# Patient Record
Sex: FEMALE | Race: WHITE | NOT HISPANIC OR LATINO | Employment: UNEMPLOYED | URBAN - METROPOLITAN AREA
[De-identification: names, ages, dates, MRNs, and addresses within clinical notes are randomized per-mention and may not be internally consistent; named-entity substitution may affect disease eponyms.]

---

## 2023-04-11 ENCOUNTER — OFFICE VISIT (OUTPATIENT)
Dept: PRIMARY CARE | Facility: CLINIC | Age: 23
End: 2023-04-11
Payer: COMMERCIAL

## 2023-04-11 VITALS
WEIGHT: 117 LBS | BODY MASS INDEX: 20.08 KG/M2 | OXYGEN SATURATION: 99 % | DIASTOLIC BLOOD PRESSURE: 82 MMHG | TEMPERATURE: 97.4 F | HEART RATE: 86 BPM | SYSTOLIC BLOOD PRESSURE: 124 MMHG

## 2023-04-11 DIAGNOSIS — L72.9 INFECTED CYST OF SKIN: Primary | ICD-10-CM

## 2023-04-11 DIAGNOSIS — L08.9 INFECTED CYST OF SKIN: Primary | ICD-10-CM

## 2023-04-11 PROCEDURE — 99213 OFFICE O/P EST LOW 20 MIN: CPT | Performed by: FAMILY MEDICINE

## 2023-04-11 PROCEDURE — 1036F TOBACCO NON-USER: CPT | Performed by: FAMILY MEDICINE

## 2023-04-11 RX ORDER — DOXYCYCLINE 100 MG/1
100 CAPSULE ORAL 2 TIMES DAILY
Qty: 10 CAPSULE | Refills: 0 | Status: SHIPPED | OUTPATIENT
Start: 2023-04-11 | End: 2023-04-16

## 2023-04-11 RX ORDER — MESALAMINE 500 MG/1
500 CAPSULE, EXTENDED RELEASE ORAL 2 TIMES DAILY
COMMUNITY

## 2023-04-11 RX ORDER — METRONIDAZOLE 7.5 MG/G
CREAM TOPICAL
COMMUNITY
Start: 2021-01-07 | End: 2023-12-18

## 2023-04-11 RX ORDER — FLUOXETINE 20 MG/1
TABLET ORAL
COMMUNITY

## 2023-04-11 RX ORDER — DESONIDE 0.5 MG/G
CREAM TOPICAL
COMMUNITY
Start: 2019-06-18 | End: 2023-12-18

## 2023-04-11 RX ORDER — LANSOPRAZOLE 30 MG/1
30 CAPSULE, DELAYED RELEASE ORAL DAILY
COMMUNITY

## 2023-04-11 RX ORDER — ONDANSETRON 4 MG/1
TABLET, ORALLY DISINTEGRATING ORAL
COMMUNITY

## 2023-04-11 RX ORDER — ADALIMUMAB 40MG/0.4ML
KIT SUBCUTANEOUS
COMMUNITY

## 2023-04-11 RX ORDER — BUSPIRONE HYDROCHLORIDE 5 MG/1
1 TABLET ORAL 2 TIMES DAILY
COMMUNITY

## 2023-04-11 RX ORDER — TRETINOIN 0.25 MG/G
CREAM TOPICAL
COMMUNITY
Start: 2022-11-02

## 2023-04-11 RX ORDER — RIMEGEPANT SULFATE 75 MG/75MG
1 TABLET, ORALLY DISINTEGRATING ORAL EVERY OTHER DAY
COMMUNITY
Start: 2022-03-10

## 2023-04-11 RX ORDER — RIZATRIPTAN BENZOATE 5 MG/1
TABLET ORAL
COMMUNITY
Start: 2023-03-30

## 2023-04-11 RX ORDER — CLOBETASOL PROPIONATE 0.46 MG/ML
SOLUTION TOPICAL
COMMUNITY
End: 2023-12-05

## 2023-04-11 RX ORDER — CLONAZEPAM 0.5 MG/1
TABLET ORAL
COMMUNITY
Start: 2018-06-25

## 2023-04-11 RX ORDER — FOLIC ACID 1 MG/1
1 TABLET ORAL DAILY
COMMUNITY
Start: 2019-08-07

## 2023-04-11 RX ORDER — CALCIUM CARBONATE/VITAMIN D3 500 MG-10
TABLET,CHEWABLE ORAL 2 TIMES DAILY
COMMUNITY

## 2023-04-11 RX ORDER — KETOCONAZOLE 20 MG/ML
SHAMPOO, SUSPENSION TOPICAL
COMMUNITY
End: 2023-12-18

## 2023-04-11 RX ORDER — FLUTICASONE PROPIONATE 50 MCG
SPRAY, SUSPENSION (ML) NASAL
COMMUNITY
End: 2023-12-20 | Stop reason: SDUPTHER

## 2023-04-11 ASSESSMENT — PATIENT HEALTH QUESTIONNAIRE - PHQ9
2. FEELING DOWN, DEPRESSED OR HOPELESS: NOT AT ALL
1. LITTLE INTEREST OR PLEASURE IN DOING THINGS: NOT AT ALL
SUM OF ALL RESPONSES TO PHQ9 QUESTIONS 1 AND 2: 0

## 2023-04-11 NOTE — PROGRESS NOTES
"  Assessment     ASSESSMENT/PLAN:      Problem List Items Addressed This Visit    None  Visit Diagnoses       Sebaceous cyst    -  Primary    Relevant Orders    Referral to General Surgery            Patient Instructions:  Patient Instructions   I believe the mass is likely a sebaceous cyst. Will send you to general surgery for further evaluation and possible removal.       Signed by: Susanna Ryan DO       FUTURE DIRECTION:   none    Subjective   SUBJECTIVE:     HPI : Patient is a 23 y.o. female who presents today for the following:     Left axillary mass: has been ongoing for the past 2 months, last week was swollen, painful and looks \"bruised\"  Deneis prior. States that she has been waking up with night sweats which attributes to her medication but deneis weightloss, chills, or fever, or changes in appetite.         Review of Systems   Skin:         Mass under left axillary        Past Medical History:   Diagnosis Date    Impacted cerumen, bilateral 07/27/2018    Excessive ear wax, bilateral    Other specified disorders of muscle 07/13/2018    Pelvic floor dysfunction    Personal history of other diseases of the digestive system     History of Crohn's disease    Personal history of other diseases of the respiratory system 04/14/2018    History of nasal obstruction    Personal history of other diseases of the respiratory system 05/16/2018    History of nasal discharge    Personal history of other drug therapy     COVID-19 vaccine series completed    Personal history of other mental and behavioral disorders 12/30/2018    History of anorexia nervosa    Psychological and behavioral factors associated with disorders or diseases classified elsewhere 12/07/2013    Psychological factor affecting physical condition        Past Surgical History:   Procedure Laterality Date    OTHER SURGICAL HISTORY  08/07/2019    Rhinologic surgery        Current Outpatient Medications   Medication Instructions    busPIRone " (Buspar) 5 mg tablet 1 tablet, oral, 2 times daily    calcium carbonate-vitamin D3 500 mg-10 mcg (400 unit) chewable tablet oral, 2 times daily    clobetasol (Temovate) 0.05 % external solution PLEASE SEE ATTACHED FOR DETAILED DIRECTIONS    clonazePAM (KlonoPIN) 0.5 mg tablet     desonide (DesOwen) 0.05 % cream PLEASE SEE ATTACHED FOR DETAILED DIRECTIONS    FLUoxetine (PROzac) 20 mg tablet TAKE 1.5 (30MG) TABLET BY MOUTH DAILY.    fluticasone (Flonase) 50 mcg/actuation nasal spray Inhale 1 sprays into each nostril once daily    folic acid (Folvite) 1 mg tablet 1 tablet, oral, Daily    Humira,CF, 40 mg/0.4 mL syringe kit prefilled syringe     ketoconazole (NIZOral) 2 % shampoo     Lactobacillus rhamnosus GG (CULTURELLE) 15 billion cell capsule, sprinkle capsule oral    lansoprazole (PREVACID) 30 mg, oral, Daily    mesalamine (PENTASA) 500 mg, oral, 2 times daily, Do not crush, chew, or split.     metroNIDAZOLE (Metrocream) 0.75 % cream     Nurtec ODT 75 mg tablet,disintegrating 1 tablet, oral, Every other day    ondansetron ODT (Zofran-ODT) 4 mg disintegrating tablet TAKE 1 TABLET BY MOUTH EVERY 8 HOURS AS NEEDED FOR NAUSEA/VOMITING. DISSOLVE ON TONGUE.    rizatriptan (Maxalt) 5 mg tablet TAKE 1 TABLET BY MOUTH AS NEEDED. MAY REPEAT IN 2 HOURS IF NEEDED    tretinoin (Retin-A) 0.025 % cream         Allergies   Allergen Reactions    Infliximab Anaphylaxis and Other     chest pain    Bee Pollen Other    Ibuprofen Other        Social History     Socioeconomic History    Marital status: Single     Spouse name: Not on file    Number of children: Not on file    Years of education: Not on file    Highest education level: Not on file   Occupational History    Not on file   Tobacco Use    Smoking status: Never    Smokeless tobacco: Never   Vaping Use    Vaping status: Not on file   Substance and Sexual Activity    Alcohol use: Not on file    Drug use: Not on file    Sexual activity: Not on file   Other Topics Concern    Not on  file   Social History Narrative    Not on file     Social Determinants of Health     Financial Resource Strain: Not on file   Food Insecurity: Not on file   Transportation Needs: Not on file   Physical Activity: Not on file   Stress: Not on file   Social Connections: Not on file   Intimate Partner Violence: Not on file   Housing Stability: Not on file        No family history on file.     Objective     OBJECTIVE:     Vitals:    04/11/23 1443   BP: 124/82   Pulse: 86   Temp: 36.3 °C (97.4 °F)   SpO2: 99%   Weight: 53.1 kg (117 lb)        Physical Exam  Constitutional:       Appearance: Normal appearance.   HENT:      Head: Normocephalic.   Pulmonary:      Effort: Pulmonary effort is normal.   Musculoskeletal:      Cervical back: Normal range of motion.   Skin:     Comments: ~1cm axillary mass that soft and mobile but does have ecchymosis    Neurological:      Mental Status: She is alert.   Psychiatric:         Mood and Affect: Mood normal.

## 2023-04-11 NOTE — PATIENT INSTRUCTIONS
I believe the mass is likely a sebaceous cyst. Will send you to general surgery for further evaluation and possible removal. Start doxycycline to help with infection

## 2023-04-14 ENCOUNTER — APPOINTMENT (OUTPATIENT)
Dept: PRIMARY CARE | Facility: CLINIC | Age: 23
End: 2023-04-14
Payer: COMMERCIAL

## 2023-04-15 LAB
GRAM STAIN: ABNORMAL
TISSUE/WOUND CULTURE/SMEAR: ABNORMAL

## 2023-04-18 ENCOUNTER — APPOINTMENT (OUTPATIENT)
Dept: PRIMARY CARE | Facility: CLINIC | Age: 23
End: 2023-04-18
Payer: COMMERCIAL

## 2023-07-28 ENCOUNTER — OFFICE VISIT (OUTPATIENT)
Dept: PRIMARY CARE | Facility: CLINIC | Age: 23
End: 2023-07-28
Payer: COMMERCIAL

## 2023-07-28 VITALS
TEMPERATURE: 98 F | DIASTOLIC BLOOD PRESSURE: 68 MMHG | HEART RATE: 87 BPM | OXYGEN SATURATION: 97 % | SYSTOLIC BLOOD PRESSURE: 110 MMHG

## 2023-07-28 DIAGNOSIS — J01.90 ACUTE SINUSITIS, RECURRENCE NOT SPECIFIED, UNSPECIFIED LOCATION: Primary | ICD-10-CM

## 2023-07-28 PROBLEM — K50.90 CROHN'S DISEASE (MULTI): Status: ACTIVE | Noted: 2022-07-11

## 2023-07-28 PROBLEM — F32.A DEPRESSIVE DISORDER: Status: ACTIVE | Noted: 2020-07-30

## 2023-07-28 PROBLEM — J30.89 ALLERGIC RHINITIS DUE TO DUST MITE: Status: ACTIVE | Noted: 2023-07-28

## 2023-07-28 PROBLEM — N80.9 ENDOMETRIOSIS: Status: ACTIVE | Noted: 2019-05-31

## 2023-07-28 PROBLEM — F41.1 GENERALIZED ANXIETY DISORDER: Status: ACTIVE | Noted: 2022-09-29

## 2023-07-28 PROCEDURE — 1036F TOBACCO NON-USER: CPT | Performed by: FAMILY MEDICINE

## 2023-07-28 PROCEDURE — 99213 OFFICE O/P EST LOW 20 MIN: CPT | Performed by: FAMILY MEDICINE

## 2023-07-28 RX ORDER — LORATADINE 10 MG
10 TABLET,DISINTEGRATING ORAL DAILY
COMMUNITY

## 2023-07-28 RX ORDER — DEXTROMETHORPHAN HB/DOXYLAMINE 15-6.25/15
1 SOLUTION, ORAL ORAL DAILY
Qty: 15 TABLET | Refills: 0 | Status: SHIPPED | OUTPATIENT
Start: 2023-07-28 | End: 2023-08-12

## 2023-07-28 ASSESSMENT — ENCOUNTER SYMPTOMS
SINUS PRESSURE: 1
SHORTNESS OF BREATH: 0
SINUS PAIN: 1

## 2023-07-28 NOTE — PROGRESS NOTES
Assessment/Plan   ASSESSMENT/PLAN:      Melida was seen today for nasal congestion.  Diagnoses and all orders for this visit:  Acute sinusitis, recurrence not specified, unspecified location (Primary)  -     loratadine-pseudoephedrine (Loratadine-D)  mg 24 hr tablet; Take 1 tablet by mouth once daily for 15 days. Do not crush, chew, or split.       Patient Instructions   Acute sinusitis: Recommend Claritin-D, continue nasal lavage, continue Flonase, follow-up if no improvement    Susanna Ryan,      FUTURE DIRECTION:     Subjective   SUBJECTIVE:     Patient ID: Melida Barth is a 23 y.o. femalefor the following:    Sinus pressure  Pt states that she was diagnosed with Covid with sx of cough, myalgia, fever, diarrhea 2 weeks ago   Has been experiencing sinus pressure, clear nasal discharge   Denies fever or chills at this time   Has tried sudafed, flonase and netti pot which has been helpful     Review of Systems   HENT:  Positive for sinus pressure and sinus pain.    Respiratory:  Negative for shortness of breath.    Cardiovascular:  Negative for chest pain.       Allergies   Allergen Reactions    Infliximab Anaphylaxis and Other     chest pain    Ibuprofen Other         Current Outpatient Medications:     busPIRone (Buspar) 5 mg tablet, Take 1 tablet (5 mg) by mouth 2 times a day., Disp: , Rfl:     calcium carbonate-vitamin D3 500 mg-10 mcg (400 unit) chewable tablet, Chew twice a day., Disp: , Rfl:     clobetasol (Temovate) 0.05 % external solution, PLEASE SEE ATTACHED FOR DETAILED DIRECTIONS, Disp: , Rfl:     desonide (DesOwen) 0.05 % cream, PLEASE SEE ATTACHED FOR DETAILED DIRECTIONS, Disp: , Rfl:     FLUoxetine (PROzac) 20 mg tablet, TAKE 1.5 (30MG) TABLET BY MOUTH DAILY., Disp: , Rfl:     fluticasone (Flonase) 50 mcg/actuation nasal spray, Inhale 1 sprays into each nostril once daily, Disp: , Rfl:     folic acid (Folvite) 1 mg tablet, Take 1 tablet (1 mg) by mouth once daily., Disp: , Rfl:      Humira,CF, 40 mg/0.4 mL syringe kit prefilled syringe, , Disp: , Rfl:     ketoconazole (NIZOral) 2 % shampoo, , Disp: , Rfl:     Lactobacillus rhamnosus GG (CULTURELLE) 15 billion cell capsule, sprinkle capsule, Take by mouth., Disp: , Rfl:     lansoprazole (Prevacid) 30 mg DR capsule, Take 1 capsule (30 mg) by mouth once daily., Disp: , Rfl:     mesalamine (Pentasa) 500 mg ER capsule, Take 1 capsule (500 mg) by mouth 2 times a day. Do not crush, chew, or split., Disp: , Rfl:     metroNIDAZOLE (Metrocream) 0.75 % cream, , Disp: , Rfl:     Nurtec ODT 75 mg tablet,disintegrating, Take 1 tablet (75 mg) by mouth every other day., Disp: , Rfl:     ondansetron ODT (Zofran-ODT) 4 mg disintegrating tablet, TAKE 1 TABLET BY MOUTH EVERY 8 HOURS AS NEEDED FOR NAUSEA/VOMITING. DISSOLVE ON TONGUE., Disp: , Rfl:     rizatriptan (Maxalt) 5 mg tablet, TAKE 1 TABLET BY MOUTH AS NEEDED. MAY REPEAT IN 2 HOURS IF NEEDED, Disp: , Rfl:     tretinoin (Retin-A) 0.025 % cream, , Disp: , Rfl:     clonazePAM (KlonoPIN) 0.5 mg tablet, , Disp: , Rfl:     loratadine (Claritin Reditabs) 10 mg disintegrating tablet, Take 1 tablet (10 mg) by mouth once daily., Disp: , Rfl:     loratadine-pseudoephedrine (Loratadine-D)  mg 24 hr tablet, Take 1 tablet by mouth once daily for 15 days. Do not crush, chew, or split., Disp: 15 tablet, Rfl: 0     Patient Active Problem List   Diagnosis    Allergic rhinitis due to dust mite    Crohn's disease (CMS/HCC)    Depressive disorder    Endometriosis    Generalized anxiety disorder       Social History     Socioeconomic History    Marital status: Single     Spouse name: Not on file    Number of children: Not on file    Years of education: Not on file    Highest education level: Not on file   Occupational History    Not on file   Tobacco Use    Smoking status: Never    Smokeless tobacco: Never   Substance and Sexual Activity    Alcohol use: Not on file    Drug use: Not on file    Sexual activity: Not on file    Other Topics Concern    Not on file   Social History Narrative    Not on file     Social Determinants of Health     Financial Resource Strain: Not on file   Food Insecurity: Not on file   Transportation Needs: Not on file   Physical Activity: Not on file   Stress: Not on file   Social Connections: Not on file   Intimate Partner Violence: Not on file   Housing Stability: Not on file       Objective   OBJECTIVE:     Vitals:    07/28/23 1516   BP: 110/68   Pulse: 87   Temp: 36.7 °C (98 °F)   SpO2: 97%       Exam      Physical Exam  Constitutional:       Appearance: Normal appearance.   HENT:      Head: Normocephalic.      Right Ear: A middle ear effusion is present.      Left Ear: A middle ear effusion is present.   Pulmonary:      Effort: Pulmonary effort is normal.   Musculoskeletal:      Cervical back: Normal range of motion.   Neurological:      Mental Status: She is alert.   Psychiatric:         Mood and Affect: Mood normal.

## 2023-07-28 NOTE — PATIENT INSTRUCTIONS
Acute sinusitis: Recommend Claritin-D, continue nasal lavage, continue Flonase, follow-up if no improvement

## 2023-08-02 ENCOUNTER — OFFICE VISIT (OUTPATIENT)
Dept: PRIMARY CARE | Facility: CLINIC | Age: 23
End: 2023-08-02
Payer: COMMERCIAL

## 2023-08-02 VITALS
SYSTOLIC BLOOD PRESSURE: 102 MMHG | DIASTOLIC BLOOD PRESSURE: 70 MMHG | BODY MASS INDEX: 19.57 KG/M2 | TEMPERATURE: 97.3 F | OXYGEN SATURATION: 97 % | WEIGHT: 114 LBS | HEART RATE: 62 BPM

## 2023-08-02 DIAGNOSIS — R35.0 FREQUENT URINATION: Primary | ICD-10-CM

## 2023-08-02 LAB
POC APPEARANCE, URINE: ABNORMAL
POC BILIRUBIN, URINE: NEGATIVE
POC BLOOD, URINE: NEGATIVE
POC COLOR, URINE: ABNORMAL
POC GLUCOSE, URINE: NEGATIVE MG/DL
POC KETONES, URINE: NEGATIVE MG/DL
POC LEUKOCYTES, URINE: NEGATIVE
POC NITRITE,URINE: NEGATIVE
POC PH, URINE: 7.5 PH
POC PROTEIN, URINE: NEGATIVE MG/DL
POC SPECIFIC GRAVITY, URINE: 1.01
POC UROBILINOGEN, URINE: 0.2 EU/DL

## 2023-08-02 PROCEDURE — 1036F TOBACCO NON-USER: CPT | Performed by: FAMILY MEDICINE

## 2023-08-02 PROCEDURE — 87661 TRICHOMONAS VAGINALIS AMPLIF: CPT

## 2023-08-02 PROCEDURE — 87086 URINE CULTURE/COLONY COUNT: CPT

## 2023-08-02 PROCEDURE — 99213 OFFICE O/P EST LOW 20 MIN: CPT | Performed by: FAMILY MEDICINE

## 2023-08-02 PROCEDURE — 87591 N.GONORRHOEAE DNA AMP PROB: CPT

## 2023-08-02 PROCEDURE — 87491 CHLMYD TRACH DNA AMP PROBE: CPT

## 2023-08-02 PROCEDURE — 87205 SMEAR GRAM STAIN: CPT

## 2023-08-02 PROCEDURE — 81003 URINALYSIS AUTO W/O SCOPE: CPT | Performed by: FAMILY MEDICINE

## 2023-08-02 RX ORDER — SULFAMETHOXAZOLE AND TRIMETHOPRIM 800; 160 MG/1; MG/1
1 TABLET ORAL 2 TIMES DAILY
Qty: 6 TABLET | Refills: 0 | Status: SHIPPED | OUTPATIENT
Start: 2023-08-02 | End: 2023-08-05

## 2023-08-02 ASSESSMENT — ENCOUNTER SYMPTOMS: FREQUENCY: 1

## 2023-08-02 NOTE — PROGRESS NOTES
Assessment     ASSESSMENT/PLAN:      Problem List Items Addressed This Visit    None  Visit Diagnoses       Frequent urination    -  Primary    Relevant Medications    sulfamethoxazole-trimethoprim (Bactrim DS) 800-160 mg tablet    Other Relevant Orders    POCT UA Automated manually resulted (Completed)    Vaginitis Gram Stain For Bacterial Vaginosis + Yeast    TRICH VAGINALIS, AMPLIFIED    C. Trachomatis / N. Gonorrhoeae, Amplified Detection    Urine Culture            Patient Instructions:  Patient Instructions   Urinary frequency: We will send prescription for Bactrim, AEs discussed.  We will also send cervical swab for yeast/BV and STD      Signed by: Susanna Ryan DO       FUTURE DIRECTION:       Subjective   SUBJECTIVE:     HPI : Patient is a 23 y.o. female who presents today for the following:     States that's he has been experiencing more milky discharge   Pelvic pressure/ cramping sensation   Tried heating therapy and it has not improved     Review of Systems   Genitourinary:  Positive for frequency.       Past Medical History:   Diagnosis Date    Impacted cerumen, bilateral 07/27/2018    Excessive ear wax, bilateral    Other specified disorders of muscle 07/13/2018    Pelvic floor dysfunction    Personal history of other diseases of the digestive system     History of Crohn's disease    Personal history of other diseases of the respiratory system 04/14/2018    History of nasal obstruction    Personal history of other diseases of the respiratory system 05/16/2018    History of nasal discharge    Personal history of other drug therapy     COVID-19 vaccine series completed    Personal history of other mental and behavioral disorders 12/30/2018    History of anorexia nervosa    Psychological and behavioral factors associated with disorders or diseases classified elsewhere 12/07/2013    Psychological factor affecting physical condition        Past Surgical History:   Procedure Laterality Date     OTHER SURGICAL HISTORY  08/07/2019    Rhinologic surgery        Current Outpatient Medications   Medication Instructions    busPIRone (Buspar) 5 mg tablet 1 tablet, oral, 2 times daily    calcium carbonate-vitamin D3 500 mg-10 mcg (400 unit) chewable tablet oral, 2 times daily    clobetasol (Temovate) 0.05 % external solution PLEASE SEE ATTACHED FOR DETAILED DIRECTIONS    clonazePAM (KlonoPIN) 0.5 mg tablet     desonide (DesOwen) 0.05 % cream PLEASE SEE ATTACHED FOR DETAILED DIRECTIONS    FLUoxetine (PROzac) 20 mg tablet TAKE 1.5 (30MG) TABLET BY MOUTH DAILY.    fluticasone (Flonase) 50 mcg/actuation nasal spray Inhale 1 sprays into each nostril once daily    folic acid (Folvite) 1 mg tablet 1 tablet, oral, Daily    Humira,CF, 40 mg/0.4 mL syringe kit prefilled syringe     ketoconazole (NIZOral) 2 % shampoo     Lactobacillus rhamnosus GG (CULTURELLE) 15 billion cell capsule, sprinkle capsule oral    lansoprazole (PREVACID) 30 mg, oral, Daily    loratadine (CLARITIN REDITABS) 10 mg, oral, Daily    loratadine-pseudoephedrine (Loratadine-D)  mg 24 hr tablet 1 tablet, oral, Daily, Do not crush, chew, or split.    mesalamine (PENTASA) 500 mg, oral, 2 times daily, Do not crush, chew, or split.     metroNIDAZOLE (Metrocream) 0.75 % cream     Nurtec ODT 75 mg tablet,disintegrating 1 tablet, oral, Every other day    ondansetron ODT (Zofran-ODT) 4 mg disintegrating tablet TAKE 1 TABLET BY MOUTH EVERY 8 HOURS AS NEEDED FOR NAUSEA/VOMITING. DISSOLVE ON TONGUE.    rizatriptan (Maxalt) 5 mg tablet TAKE 1 TABLET BY MOUTH AS NEEDED. MAY REPEAT IN 2 HOURS IF NEEDED    sulfamethoxazole-trimethoprim (Bactrim DS) 800-160 mg tablet 1 tablet, oral, 2 times daily    tretinoin (Retin-A) 0.025 % cream         Allergies   Allergen Reactions    Infliximab Anaphylaxis and Other     chest pain    Ibuprofen Other        Social History     Socioeconomic History    Marital status: Single     Spouse name: Not on file    Number of children:  Not on file    Years of education: Not on file    Highest education level: Not on file   Occupational History    Not on file   Tobacco Use    Smoking status: Never    Smokeless tobacco: Never   Substance and Sexual Activity    Alcohol use: Not on file    Drug use: Not on file    Sexual activity: Not on file   Other Topics Concern    Not on file   Social History Narrative    Not on file     Social Determinants of Health     Financial Resource Strain: Not on file   Food Insecurity: Not on file   Transportation Needs: Not on file   Physical Activity: Not on file   Stress: Not on file   Social Connections: Not on file   Intimate Partner Violence: Not on file   Housing Stability: Not on file        No family history on file.     Objective     OBJECTIVE:     Vitals:    08/02/23 1429   BP: 102/70   Pulse: 62   Temp: 36.3 °C (97.3 °F)   SpO2: 97%   Weight: 51.7 kg (114 lb)        Physical Exam  Constitutional:       Appearance: Normal appearance.   HENT:      Head: Normocephalic.   Pulmonary:      Effort: Pulmonary effort is normal.   Genitourinary:     Vagina: Vaginal discharge (clear) present.   Musculoskeletal:      Cervical back: Normal range of motion.   Neurological:      Mental Status: She is alert.   Psychiatric:         Mood and Affect: Mood normal.           Results for orders placed or performed in visit on 08/02/23   POCT UA Automated manually resulted   Result Value Ref Range    POC Color, Urine Light Yellow Straw, Yellow, Light Yellow    POC Appearance, Urine Cloudy (A) Clear    POC Specific Gravity, Urine 1.015 1.005 - 1.035    POC PH, Urine 7.5 No Reference Range Established PH    POC Protein, Urine NEGATIVE NEGATIVE, 30 (1+) mg/dl    POC Glucose, Urine NEGATIVE NEGATIVE mg/dl    POC Blood, Urine NEGATIVE NEGATIVE    POC Ketones, Urine NEGATIVE NEGATIVE mg/dl    POC Bilirubin, Urine NEGATIVE NEGATIVE    POC Urobilinogen, Urine 0.2 0.2, 1.0 EU/DL    Poc Nitrate, Urine NEGATIVE NEGATIVE    POC Leukocytes,  Urine NEGATIVE NEGATIVE

## 2023-08-02 NOTE — PATIENT INSTRUCTIONS
Urinary frequency: We will send prescription for Bactrim, AEs discussed.  We will also send cervical swab for yeast/BV and STD

## 2023-08-03 LAB
CHLAMYDIA TRACH., AMPLIFIED: NEGATIVE
CLUE CELLS: ABNORMAL
N. GONORRHEA, AMPLIFIED: NEGATIVE
NUGENT SCORE: 4
TRICHOMONAS VAGINALIS: NEGATIVE
URINE CULTURE: NORMAL
VAGINITIS-BV + YEAST INTERPRETATION: ABNORMAL
YEAST: ABNORMAL

## 2023-08-30 DIAGNOSIS — Z11.1 ENCOUNTER FOR SCREENING FOR RESPIRATORY TUBERCULOSIS: Primary | ICD-10-CM

## 2023-11-14 PROBLEM — J34.89 NASAL CRUSTING: Status: ACTIVE | Noted: 2023-11-14

## 2023-11-14 PROBLEM — L30.9 DERMATITIS, UNSPECIFIED: Status: ACTIVE | Noted: 2023-05-12

## 2023-11-14 PROBLEM — H90.3 SENSORINEURAL HEARING LOSS (SNHL) OF BOTH EARS: Status: ACTIVE | Noted: 2023-11-14

## 2023-11-14 PROBLEM — R10.2 PELVIC PAIN: Status: ACTIVE | Noted: 2023-11-14

## 2023-11-14 PROBLEM — Z98.890 S/P NASAL SURGERY: Status: ACTIVE | Noted: 2023-11-14

## 2023-11-14 PROBLEM — L40.0 PSORIASIS VULGARIS: Status: ACTIVE | Noted: 2023-05-12

## 2023-11-14 PROBLEM — N90.89 VULVAR IRRITATION: Status: ACTIVE | Noted: 2023-11-14

## 2023-11-14 PROBLEM — L71.9 ROSACEA, UNSPECIFIED: Status: ACTIVE | Noted: 2023-05-12

## 2023-11-14 PROBLEM — J34.89 NASAL DRAINAGE: Status: ACTIVE | Noted: 2023-11-14

## 2023-11-14 PROBLEM — J30.2 SEASONAL ALLERGIES: Status: ACTIVE | Noted: 2022-07-11

## 2023-11-14 PROBLEM — J34.89 NASAL OBSTRUCTION: Status: ACTIVE | Noted: 2023-11-14

## 2023-11-14 PROBLEM — G47.00 INSOMNIA, PERSISTENT: Status: ACTIVE | Noted: 2023-11-14

## 2023-11-14 PROBLEM — L29.9 PRURITUS: Status: ACTIVE | Noted: 2023-11-14

## 2023-11-14 PROBLEM — D48.5 NEOPLASM OF UNCERTAIN BEHAVIOR OF SKIN: Status: ACTIVE | Noted: 2023-05-12

## 2023-11-14 PROBLEM — F33.0 MILD RECURRENT MAJOR DEPRESSION (CMS-HCC): Status: ACTIVE | Noted: 2022-09-29

## 2023-11-14 PROBLEM — F54 PSYCHOLOGICAL FACTOR AFFECTING PHYSICAL CONDITION: Status: ACTIVE | Noted: 2023-11-14

## 2023-11-14 PROBLEM — G43.009 MIGRAINE WITHOUT AURA: Status: ACTIVE | Noted: 2022-07-11

## 2023-11-14 PROBLEM — R35.0 INCREASED FREQUENCY OF URINATION: Status: ACTIVE | Noted: 2023-11-14

## 2023-11-14 PROBLEM — L70.9 ACNE, UNSPECIFIED: Status: ACTIVE | Noted: 2023-05-12

## 2023-11-14 PROBLEM — J38.00 VOCAL CORD PARALYSIS: Status: ACTIVE | Noted: 2023-11-14

## 2023-11-14 PROBLEM — L70.0 ACNE VULGARIS: Status: ACTIVE | Noted: 2023-05-12

## 2023-11-14 PROBLEM — D18.01 HEMANGIOMA OF SKIN AND SUBCUTANEOUS TISSUE: Status: ACTIVE | Noted: 2023-05-12

## 2023-11-14 PROBLEM — F33.1 MODERATE EPISODE OF RECURRENT MAJOR DEPRESSIVE DISORDER (MULTI): Status: ACTIVE | Noted: 2023-11-14

## 2023-11-14 PROBLEM — M62.89 PELVIC FLOOR DYSFUNCTION: Status: ACTIVE | Noted: 2023-11-14

## 2023-11-14 PROBLEM — G43.909 MIGRAINE: Status: ACTIVE | Noted: 2022-09-29

## 2023-11-14 PROBLEM — J34.89 CONCHA BULLOSA: Status: ACTIVE | Noted: 2023-11-14

## 2023-11-14 PROBLEM — F43.23 ADJUSTMENT DISORDER WITH MIXED ANXIETY AND DEPRESSED MOOD: Status: ACTIVE | Noted: 2023-11-14

## 2023-11-14 PROBLEM — R07.9 CHEST PAIN: Status: ACTIVE | Noted: 2023-11-14

## 2023-11-14 PROBLEM — D49.2 NEOPLASM OF UNSPECIFIED BEHAVIOR OF BONE, SOFT TISSUE, AND SKIN: Status: ACTIVE | Noted: 2023-05-12

## 2023-11-14 RX ORDER — ONABOTULINUMTOXINA 200 [USP'U]/1
INJECTION, POWDER, LYOPHILIZED, FOR SOLUTION INTRADERMAL; INTRAMUSCULAR
COMMUNITY
Start: 2023-11-03

## 2023-11-14 RX ORDER — CALCIPOTRIENE 0.005% / CLOBETASOL PROPIONATE 0.05% .005; .05 G/100G; G/100G
SOLUTION TOPICAL
COMMUNITY
Start: 2016-08-02

## 2023-11-15 RX ORDER — FLUOXETINE 20 MG/1
40 TABLET ORAL DAILY
Qty: 180 TABLET | Refills: 1 | OUTPATIENT
Start: 2023-11-15

## 2023-12-03 DIAGNOSIS — L21.9 SEBORRHEIC DERMATITIS: Primary | ICD-10-CM

## 2023-12-04 ENCOUNTER — TELEPHONE (OUTPATIENT)
Dept: DERMATOLOGY | Facility: HOSPITAL | Age: 23
End: 2023-12-04
Payer: COMMERCIAL

## 2023-12-05 RX ORDER — CLOBETASOL PROPIONATE 0.46 MG/ML
SOLUTION TOPICAL
Qty: 50 ML | Refills: 0 | Status: SHIPPED | OUTPATIENT
Start: 2023-12-05

## 2023-12-07 NOTE — PROGRESS NOTES
Subjective   HPI: Melida Barth is a 23 y.o. female is a new patient here for evaluation and treatment of acne, subdermal, and psoriasis of scalp. This is a patient of Dr. Serrato.  Patient is currently on a regimen of sulfacetamide sodium 10% lotion desonide cream, metronidazole cream, and clobetasol solution.  Patient is happy with her current regimen and simply needs refills.  She has not had an outbreak of the hidradenitis underneath her armpits however she does have a erythematous scaling patch slightly pruritic on the left armpit.  Patient also mentions that she has noticed some hair loss.  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  She is curious if she could be tested for that today.    ROS: No other skin or systemic complaints other than what is documented elsewhere in the note.    ALLERGIES: Infliximab and Ibuprofen    SOCIAL:  reports that she has never smoked. She has never used smokeless tobacco. No history on file for alcohol use and drug use.    Objective   Scalp  No obvious physical exam findings    Left Axilla, Right Axilla, Scalp  Symmetric erythematous patches overlying greasy scales.      Head - Anterior (Face)  Scattered erythematous papules forehead however minimal number    Scalp  Minimal excoriations present on the scalp        Assessment/Plan   1. Telogen effluvium  Scalp    Discussed with patient that her hair loss is likely due to the stress of recently starting graduate school.  I discussed telogen effluvium with patient.    Discussed the nature of the telogen effluvium and the natural process of anagen, catagen and telogen phases. Discussed common triggers including physiologic and psychologic stress. Usually occurs 2-4 months after the inciting event, and often  self-resolves in approximately 6 months.     Discussed the expected course of this condition, and how it is most often self-resolving. After a period of several months of intense shedding, the shedding will gradually taper off and then the hair will begin to regrow. Reassurance provided.     Since the patient does have a family history of thyroid disorder mended we do baseline labs and check a thyroid panel.  Patient is agreeable and opts for treatment.      Related Procedures  CBC and Auto Differential  Comprehensive Metabolic Panel  TSH w/Reflex To Free T4 If Abnormal    2. Seborrheic dermatitis  Left Axilla; Right Axilla; Scalp    Continue/refill:  Metronidazole cream    Discussed with patient that the redness and flaking and irritation that is occurring underneath her left arm is likely seborrheic dermatitis.  Discussed with patient that she can use the metronidazole cream underneath the arm.  I recommended to patient if she does not get relief from this to please call.    Related Medications  clobetasol (Temovate) 0.05 % external solution  APPLY 1 APPLICATION TWICE A DAY TO AFFECTED AREA OF SCALP (NO FACE, FOLDS) FOR 2 WEEKS, TAPER TO 2-3 TIMES PER WEEK FOR MAINTENANCE REPEAT FLARES    metroNIDAZOLE (Metrocream) 0.75 % cream  Apply 1 Application topically once daily.    3. Acne rosacea  Head - Anterior (Face)    Continue/refill:  Sulfacetamide sodium 10% lotion  Desonide lotion    Discussed acne rosacea with patient.  Patient wishes to continue her current regimen.    Related Medications  sulfacetamide sodium-sulfur 9.8-4.8 % lotion  Apply 1 Application topically once daily.    desonide (DesOwen) 0.05 % cream  Apply topically 2 times a day for 14 days.    4. Psoriasis vulgaris  Scalp    Continue/refill:  Clobetasol solution    Discussed psoriasis vulgaris with patient.  I recommended continuing with clobetasol solution as it seems to be causing significant relief for the patient.  Patient opts for treatment  today.    Related Medications  clobetasol (Temovate) 0.05 % external solution  Apply topically 2 times a day for 14 days.         FOLLOW UP: 6 months    The patient was encouraged to contact me with any further questions or concerns.  Danette Rowan PA-C  12/19/2023

## 2023-12-14 ENCOUNTER — OFFICE VISIT (OUTPATIENT)
Dept: DERMATOLOGY | Facility: CLINIC | Age: 23
End: 2023-12-14
Payer: COMMERCIAL

## 2023-12-14 DIAGNOSIS — L40.0 PSORIASIS VULGARIS: ICD-10-CM

## 2023-12-14 DIAGNOSIS — L21.9 SEBORRHEIC DERMATITIS: ICD-10-CM

## 2023-12-14 DIAGNOSIS — L65.0 TELOGEN EFFLUVIUM: Primary | ICD-10-CM

## 2023-12-14 DIAGNOSIS — L71.9 ACNE ROSACEA: ICD-10-CM

## 2023-12-14 PROCEDURE — 99204 OFFICE O/P NEW MOD 45 MIN: CPT

## 2023-12-14 PROCEDURE — 1036F TOBACCO NON-USER: CPT

## 2023-12-15 ENCOUNTER — LAB (OUTPATIENT)
Dept: LAB | Facility: LAB | Age: 23
End: 2023-12-15
Payer: COMMERCIAL

## 2023-12-15 DIAGNOSIS — L65.0 TELOGEN EFFLUVIUM: ICD-10-CM

## 2023-12-15 LAB
ALBUMIN SERPL BCP-MCNC: 4.3 G/DL (ref 3.4–5)
ALP SERPL-CCNC: 59 U/L (ref 33–110)
ALT SERPL W P-5'-P-CCNC: 7 U/L (ref 7–45)
ANION GAP SERPL CALC-SCNC: 12 MMOL/L (ref 10–20)
AST SERPL W P-5'-P-CCNC: 13 U/L (ref 9–39)
BASOPHILS # BLD AUTO: 0.04 X10*3/UL (ref 0–0.1)
BASOPHILS NFR BLD AUTO: 0.6 %
BILIRUB SERPL-MCNC: 0.8 MG/DL (ref 0–1.2)
BUN SERPL-MCNC: 14 MG/DL (ref 6–23)
CALCIUM SERPL-MCNC: 8.9 MG/DL (ref 8.6–10.3)
CHLORIDE SERPL-SCNC: 104 MMOL/L (ref 98–107)
CO2 SERPL-SCNC: 26 MMOL/L (ref 21–32)
CREAT SERPL-MCNC: 0.59 MG/DL (ref 0.5–1.05)
EOSINOPHIL # BLD AUTO: 0.18 X10*3/UL (ref 0–0.7)
EOSINOPHIL NFR BLD AUTO: 2.9 %
ERYTHROCYTE [DISTWIDTH] IN BLOOD BY AUTOMATED COUNT: 12.5 % (ref 11.5–14.5)
GFR SERPL CREATININE-BSD FRML MDRD: >90 ML/MIN/1.73M*2
GLUCOSE SERPL-MCNC: 83 MG/DL (ref 74–99)
HCT VFR BLD AUTO: 39.7 % (ref 36–46)
HGB BLD-MCNC: 13.1 G/DL (ref 12–16)
IMM GRANULOCYTES # BLD AUTO: 0.01 X10*3/UL (ref 0–0.7)
IMM GRANULOCYTES NFR BLD AUTO: 0.2 % (ref 0–0.9)
LYMPHOCYTES # BLD AUTO: 2.61 X10*3/UL (ref 1.2–4.8)
LYMPHOCYTES NFR BLD AUTO: 41.7 %
MCH RBC QN AUTO: 30.6 PG (ref 26–34)
MCHC RBC AUTO-ENTMCNC: 33 G/DL (ref 32–36)
MCV RBC AUTO: 93 FL (ref 80–100)
MONOCYTES # BLD AUTO: 1.06 X10*3/UL (ref 0.1–1)
MONOCYTES NFR BLD AUTO: 16.9 %
NEUTROPHILS # BLD AUTO: 2.36 X10*3/UL (ref 1.2–7.7)
NEUTROPHILS NFR BLD AUTO: 37.7 %
NRBC BLD-RTO: 0 /100 WBCS (ref 0–0)
PLATELET # BLD AUTO: 294 X10*3/UL (ref 150–450)
POTASSIUM SERPL-SCNC: 3.8 MMOL/L (ref 3.5–5.3)
PROT SERPL-MCNC: 7.2 G/DL (ref 6.4–8.2)
RBC # BLD AUTO: 4.28 X10*6/UL (ref 4–5.2)
SODIUM SERPL-SCNC: 138 MMOL/L (ref 136–145)
TSH SERPL-ACNC: 1.06 MIU/L (ref 0.44–3.98)
WBC # BLD AUTO: 6.3 X10*3/UL (ref 4.4–11.3)

## 2023-12-15 PROCEDURE — 85025 COMPLETE CBC W/AUTO DIFF WBC: CPT

## 2023-12-15 PROCEDURE — 36415 COLL VENOUS BLD VENIPUNCTURE: CPT

## 2023-12-15 PROCEDURE — 80053 COMPREHEN METABOLIC PANEL: CPT

## 2023-12-15 PROCEDURE — 84443 ASSAY THYROID STIM HORMONE: CPT

## 2023-12-17 DIAGNOSIS — L21.9 SEBORRHEIC DERMATITIS: Primary | ICD-10-CM

## 2023-12-17 DIAGNOSIS — L71.9 ACNE ROSACEA: ICD-10-CM

## 2023-12-18 RX ORDER — DESONIDE 0.5 MG/G
CREAM TOPICAL
Qty: 60 G | Refills: 6 | Status: SHIPPED | OUTPATIENT
Start: 2023-12-18

## 2023-12-18 RX ORDER — KETOCONAZOLE 20 MG/ML
SHAMPOO, SUSPENSION TOPICAL
Qty: 120 ML | Refills: 11 | Status: SHIPPED | OUTPATIENT
Start: 2023-12-18

## 2023-12-18 RX ORDER — SULFACETAMIDE SODIUM 100 MG/ML
LOTION TOPICAL
Qty: 118 ML | Refills: 6 | Status: SHIPPED | OUTPATIENT
Start: 2023-12-18

## 2023-12-18 RX ORDER — METRONIDAZOLE 7.5 MG/G
CREAM TOPICAL
Qty: 45 G | Refills: 6 | Status: SHIPPED | OUTPATIENT
Start: 2023-12-18

## 2023-12-19 RX ORDER — METRONIDAZOLE 7.5 MG/G
1 CREAM TOPICAL DAILY
Qty: 45 G | Refills: 3 | Status: SHIPPED | OUTPATIENT
Start: 2023-12-19 | End: 2024-12-18

## 2023-12-19 RX ORDER — CLOBETASOL PROPIONATE 0.46 MG/ML
SOLUTION TOPICAL 2 TIMES DAILY
Qty: 50 ML | Refills: 3 | Status: SHIPPED | OUTPATIENT
Start: 2023-12-19 | End: 2024-01-02

## 2023-12-19 RX ORDER — SULFACETAMIDE SODIUM, SULFUR 98; 48 MG/G; MG/G
1 LOTION TOPICAL DAILY
Qty: 57 G | Refills: 2 | Status: SHIPPED | OUTPATIENT
Start: 2023-12-19

## 2023-12-19 RX ORDER — DESONIDE 0.5 MG/G
CREAM TOPICAL 2 TIMES DAILY
Qty: 60 G | Refills: 3 | Status: SHIPPED | OUTPATIENT
Start: 2023-12-19 | End: 2024-01-02

## 2023-12-20 ENCOUNTER — PATIENT MESSAGE (OUTPATIENT)
Dept: DERMATOLOGY | Facility: CLINIC | Age: 23
End: 2023-12-20

## 2023-12-20 ENCOUNTER — OFFICE VISIT (OUTPATIENT)
Dept: OTOLARYNGOLOGY | Facility: CLINIC | Age: 23
End: 2023-12-20
Payer: COMMERCIAL

## 2023-12-20 ENCOUNTER — TELEMEDICINE (OUTPATIENT)
Dept: DERMATOLOGY | Facility: CLINIC | Age: 23
End: 2023-12-20
Payer: COMMERCIAL

## 2023-12-20 VITALS — BODY MASS INDEX: 20.49 KG/M2 | HEIGHT: 64 IN | WEIGHT: 120 LBS

## 2023-12-20 DIAGNOSIS — J02.9 SORE THROAT: ICD-10-CM

## 2023-12-20 DIAGNOSIS — L73.2 HIDRADENITIS SUPPURATIVA: Primary | ICD-10-CM

## 2023-12-20 DIAGNOSIS — H61.23 BILATERAL IMPACTED CERUMEN: ICD-10-CM

## 2023-12-20 DIAGNOSIS — H60.8X3 CHRONIC ECZEMATOUS OTITIS EXTERNA OF BOTH EARS: ICD-10-CM

## 2023-12-20 DIAGNOSIS — J30.89 ALLERGIC RHINITIS DUE TO DUST MITE: Primary | ICD-10-CM

## 2023-12-20 PROBLEM — D18.01 HEMANGIOMA OF SKIN AND SUBCUTANEOUS TISSUE: Status: RESOLVED | Noted: 2023-05-12 | Resolved: 2023-12-20

## 2023-12-20 PROBLEM — D48.5 NEOPLASM OF UNCERTAIN BEHAVIOR OF SKIN: Status: RESOLVED | Noted: 2023-05-12 | Resolved: 2023-12-20

## 2023-12-20 PROBLEM — L70.9 ACNE, UNSPECIFIED: Status: RESOLVED | Noted: 2023-05-12 | Resolved: 2023-12-20

## 2023-12-20 PROBLEM — D49.2 NEOPLASM OF UNSPECIFIED BEHAVIOR OF BONE, SOFT TISSUE, AND SKIN: Status: RESOLVED | Noted: 2023-05-12 | Resolved: 2023-12-20

## 2023-12-20 PROCEDURE — 99214 OFFICE O/P EST MOD 30 MIN: CPT | Performed by: DERMATOLOGY

## 2023-12-20 PROCEDURE — 1036F TOBACCO NON-USER: CPT | Performed by: NURSE PRACTITIONER

## 2023-12-20 PROCEDURE — 69210 REMOVE IMPACTED EAR WAX UNI: CPT | Performed by: NURSE PRACTITIONER

## 2023-12-20 PROCEDURE — 99213 OFFICE O/P EST LOW 20 MIN: CPT | Performed by: NURSE PRACTITIONER

## 2023-12-20 RX ORDER — CLINDAMYCIN PHOSPHATE 10 UG/ML
LOTION TOPICAL 2 TIMES DAILY
Qty: 60 ML | Refills: 11 | Status: SHIPPED | OUTPATIENT
Start: 2023-12-20 | End: 2024-12-19

## 2023-12-20 RX ORDER — FLUTICASONE PROPIONATE 50 MCG
SPRAY, SUSPENSION (ML) NASAL
Qty: 16 G | Refills: 11 | Status: SHIPPED | OUTPATIENT
Start: 2023-12-20

## 2023-12-20 RX ORDER — BENZOYL PEROXIDE 50 MG/ML
LIQUID TOPICAL EVERY MORNING
Qty: 227 G | Refills: 11 | Status: SHIPPED | OUTPATIENT
Start: 2023-12-20 | End: 2024-12-19

## 2023-12-20 ASSESSMENT — PATIENT HEALTH QUESTIONNAIRE - PHQ9
1. LITTLE INTEREST OR PLEASURE IN DOING THINGS: NOT AT ALL
2. FEELING DOWN, DEPRESSED OR HOPELESS: NOT AT ALL
SUM OF ALL RESPONSES TO PHQ9 QUESTIONS 1 AND 2: 0

## 2023-12-20 NOTE — PATIENT INSTRUCTIONS
AAD Hidradenitis suppurativa:  [email or web address]://[email or web address].org/public/diseases/[email or web address]/[email or web address]-[email or web address]-overview    [employer]

## 2023-12-20 NOTE — PROGRESS NOTES
Subjective   Patient ID: Melida Barth is a 23 y.o. female who presents for Follow-up (Ear cleaning.).  HPI  Patient presents for follow-up visit.  In review, she has a history of cerumen impactions and eczematous otitis externa.  She has been using steroid drops as needed with good relief.  Patient is also requesting a refill on her Flonase for chronic allergic rhinitis.  Lastly, she is also expressing concern about recent mucous collection in her throat causing some pain.  Today, she denies any otalgia, otorrhea.  Review of Systems  A comprehensive or 10 points review of the patient´s constitutional, neurological, HEENT, pulmonary, cardiovascular and genito-urinary systems showed only those mentioned in history of present illness.    Objective   Physical Exam  Constitutional: no fever, chills, weight loss or weight gain   General appearance: Appears well, well-nourished, well groomed. No acute distress.   Communication: Normal communication   Psychiatric: Oriented to person, place and time. Normal mood and affect.   Neurologic: Cranial nerves II-XII grossly intact and symmetric bilaterally.   Head and Face:   Head: Atraumatic with no masses, lesions or scarring.   Face: Normal symmetry, no paralysis, synkinesis or facial tic. No scars or deformities.     Eyes: Conjunctiva not edematous or erythematous   Ears: External inspection of ears with no deformity, scars or masses.  Bilateral canals with cerumen impactions     Neck: Normal appearing, symmetric, trachea midline.   Cardiovascular: Examination of peripheral vascular system shows no clubbing or cyanosis.   Respiratory: No respiratory distress increased work of breathing. Inspection of the chest with symmetric chest expansion and normal respiratory effort.   Skin: No rashes in the head or neck    Assessment/Plan     This patient presents for subsequent evaluation of acute acquired bilateral cerumen impaction and mucus in throat causing sore throat as well as  chronic eczematous otitis externa and allergic rhinitis.     Reassurance given that otologic exam looks great after cleaning.  Her eczema is well-controlled with steroid drops.  I am happy to send a refill for her Flonase.  Our office will try to get her scheduled with one of my generalist partners for evaluation of her mucus in her throat.  We discussed that this could be postnasal drip versus reflux.  Patient has history of Crohn's disease.  She is already taking medications for reflux.  All questions were answered to patient's satisfaction.    This note was created using speech recognition transcription software. Despite proofreading, several typographical errors might be present that might affect the meaning of the content. Please call with any questions.  Patient ID: Melida Barth is a 23 y.o. female.    Ear cerumen removal    Date/Time: 12/20/2023 4:15 PM    Performed by: REGINALDO Castro  Authorized by: REGINALDO Castro    Consent:     Consent obtained:  Verbal    Consent given by:  Patient    Risks discussed:  Pain    Alternatives discussed:  No treatment  Procedure details:     Location:  L ear and R ear    Procedure type: curette      Procedure outcomes: cerumen removed    Post-procedure details:     Inspection:  No bleeding, ear canal clear and TM intact    Hearing quality:  Normal    Procedure completion:  Tolerated well, no immediate complications    REGINALDO Castro 12/20/23 4:08 PM

## 2023-12-20 NOTE — PROGRESS NOTES
Subjective     Melida Barth is a 23 y.o. female who presents for the following: Rash.         Review of Systems:  No other skin or systemic complaints other than what is documented elsewhere in the note.    The following portions of the chart were reviewed this encounter and updated as appropriate:          Skin Cancer History  No skin cancer on file.      Specialty Problems          Dermatology Problems    Acne vulgaris    Dermatitis, unspecified    Psoriasis vulgaris    Rosacea, unspecified    Pruritus        Objective   Well appearing patient in no apparent distress; mood and affect are within normal limits.    A focused skin examination was performed. All findings within normal limits unless otherwise noted below.    Assessment/Plan   1. Hidradenitis suppurativa  Left Axilla, Right Axilla  Small superficial pink papule in axillae    4/12/23 had ruptured infected cyst in left axilla that grew MSSA. Pathology showed deep inflammatory mixed infiltrate that could be consistent with hidradenitis suppurativa. Given her Chron's disease we have low threshold to suspect hidradenitis    Since last visit she is flaring a little. Has not noticed association with shaving; she has tried taking breaks before and that was when she had MSSA    -Discussed nature of condition  -Discussed treatment options    - Today it is stage 1, we will start topicals as below and follow up    Related Procedures  Follow Up In Dermatology - Established Patient    Related Medications  benzoyl peroxide 5 % external wash  Apply topically once daily in the morning. May bleach fabrics, use an old or white towel    clindamycin (Cleocin T) 1 % lotion  Apply topically 2 times a day. To new hair bumps        Follow up 3-4 months HIDRADENITIS SUPPURATIVA in person or virtual visit

## 2023-12-29 ENCOUNTER — PATIENT MESSAGE (OUTPATIENT)
Dept: DERMATOLOGY | Facility: CLINIC | Age: 23
End: 2023-12-29
Payer: COMMERCIAL

## 2024-01-04 ENCOUNTER — APPOINTMENT (OUTPATIENT)
Dept: OTOLARYNGOLOGY | Facility: CLINIC | Age: 24
End: 2024-01-04
Payer: COMMERCIAL

## 2024-04-03 ENCOUNTER — APPOINTMENT (OUTPATIENT)
Dept: DERMATOLOGY | Facility: CLINIC | Age: 24
End: 2024-04-03
Payer: COMMERCIAL

## 2024-05-06 ENCOUNTER — TELEPHONE (OUTPATIENT)
Dept: DERMATOLOGY | Facility: CLINIC | Age: 24
End: 2024-05-06
Payer: COMMERCIAL

## 2024-05-30 ENCOUNTER — APPOINTMENT (OUTPATIENT)
Dept: DERMATOLOGY | Facility: CLINIC | Age: 24
End: 2024-05-30
Payer: COMMERCIAL

## 2024-12-18 ENCOUNTER — CLINICAL SUPPORT (OUTPATIENT)
Dept: AUDIOLOGY | Facility: CLINIC | Age: 24
End: 2024-12-18
Payer: COMMERCIAL

## 2024-12-18 DIAGNOSIS — H93.8X3 EAR PRESSURE, BILATERAL: Primary | ICD-10-CM

## 2024-12-18 PROCEDURE — 92567 TYMPANOMETRY: CPT

## 2024-12-18 PROCEDURE — 92557 COMPREHENSIVE HEARING TEST: CPT

## 2024-12-18 ASSESSMENT — PAIN - FUNCTIONAL ASSESSMENT: PAIN_FUNCTIONAL_ASSESSMENT: 0-10

## 2024-12-18 ASSESSMENT — PAIN SCALES - GENERAL: PAINLEVEL_OUTOF10: 4

## 2024-12-18 NOTE — PROGRESS NOTES
AUDIOLOGY ADULT AUDIOMETRIC EVALUATION     Name: Meliad Barth   : 2000 Age: 24 y.o.   Date of Evaluation: 2024 Time: 3294-7294     IMPRESSIONS   Improvement in thresholds in both ears since last evaluation on 3/10/2022. Hearing sensitivity within normal limits in both ears.  Word recognition in quiet is excellent in both ears.  DPOAE responses essentially present in both ears.  Tympanometry indicates normal middle ear pressure and tympanic membrane mobility in both ears.   Amplification needs: Amplification is not warranted at this time.    Today's test results are normal hearing sensitivity.     RECOMMENDATIONS   Continue medical follow up with primary care provider and/or Ears Nose and Throat (ENT) provider as recommended.  Return for audiologic evaluation in conjunction with medical management to monitor hearing sensitivity and assess middle ear status or sooner should concerns arise. The audiology department can be reached at [phone] to schedule an appointment.   Avoid exposure to loud sounds by moving away from the noise, turning down the volume, or wearing proper hearing protection correctly.    HISTORY   History obtained from patient report and chart review; please see medical records for complete history. Ms. Barth was seen today for a follow-up audiologic evaluation prior to an evaluation with Linnette Arias APRN.CNP.    Reason for visit: Ear infections; reports ear issues since she was a teenager. Double ear infection in the summer, fall, and believes currently.   Change in Hearing: yes, things are muffled when she has an active ear infection  Tinnitus: denied  Otalgia: yes, 4/10 in both ears, in the left ear greater than the right  Aural Pressure/Fullness: yes in both ears; pressure like when you are in a plane   Recent Ear Infections/Otorrhea:  no drainage  Dizziness: yes, described as unsteady, lasts for a couple seconds, and occurs when getting up from laying down.   History  "of Ear Surgeries: denied; sinus surgery in 2018  History of Noise Exposure: denied  Hearing Aid Use: None  Family History of Hearing Loss: Yes, maternal grandmother with aging; parents in late fifties with hearing loss  Falls within the last year: denied  Other Significant History: denied    Previous audiometric testing performed on 3/10/2024 revealed normal middle ear function and hearing sensitivity within normal limits for both ears.     Recall from previous encounter in the Ears Nose and Throat (ENT) department on 3/10/2022: Melida seen in the office today regarding her ears today they were cleaned under microotoscopy she states that they did not feel better after the removal of wax they were not itching as bad today we obtained a hearing test and pressure test that revealed hearing to be essentially within normal limits with type a tympanograms and excellent discrimination she states the ringing is better since the ears were cleaned and at this time she is looking to graduate schools and jobs and therefore she wishes to hold on making a follow-up appointment and she states will call as needed for a follow-up appointment. We did give her a card for she does know that I am retiring at the end of the month with this if any problems arise in the interim she is urged to call she states her questions were answered in satisfactory manner. This note was created using speech recognition transcription software. Despite proofreading, some grammatical errors may be present due to the limitations of the software.     EVALUATION AND PATIENT EDUCATION   The following is a brief interpretation of the obtained findings from the audiologic evaluation. Discussed results and recommendations with Ms. Tab. Questions were addressed and the patient was encouraged to contact our department at [phone] should concerns arise.     TEST RESULTS - See scanned audiogram in \"Media.\"   Otoscopic Evaluation:   Right Ear: Ear canal " clear, tympanic membrane visualized.   Left Ear: Ear canal clear, tympanic membrane visualized.       Tympanometry (226 Hz): An objective evaluation of middle ear function. CPT code: 01490   Right Ear: Type A, middle ear pressure and tympanic membrane compliance within normal limits.   Left Ear: Type A, middle ear pressure and tympanic membrane compliance within normal limits.       Acoustic Reflexes: An objective measure of auditory and facial nerve pathways.   (Probe) Right Ear (ipsi right stimulus ear; contralateral left stimulus ear):   Did not test.   (Probe) Left Ear (ipsi left stimulus ear; contralateral right stimulus ear):   Did not test.       Distortion Product Otoacoustic Emissions (DPOAE): An objective measurement of responses generated by the cochlea when simultaneously stimulated by two pure tone frequencies. CPT code: 14863   Right Ear: (9640-5533 Hz) Essentially present. Responses present at 6859-1943, 7496-4867 Hz. Response(s) absent at 1000, 4818-8979, 8000 Hz.   Left Ear: (3425-8455 Hz) Essentially present. Responses present at 7452-0209 Hz. Response(s) absent at 3155-4827 Hz.   Present OAEs suggest normal or near cochlear outer hair cell function for corresponding frequency region(s). Absent OAEs with normal middle ear function can be consistent with some degree of hearing loss. Assessment of cochlear outer hair cell function may be impacted by outer or middle ear function.       Test technique: Conventional Audiometry via insert earphones.   An evaluation of hearing sensitivity via air and bone conduction and speech recognition. CPT code: 85983    Reliability: good       Pure Tone Audiometry:    Right Ear: Hearing sensitivity within normal limits for 125-8000 Hz.   Left Ear: Hearing sensitivity within normal limits for 125-8000 Hz.       Speech Audiometry:    Right Ear: Speech Reception Threshold (SRT) was obtained at 10 dB HL. This is in good agreement with three frequency Pure Tone Average  (PTA).  Word Recognition scores in quiet were excellent (100%) when words were presented at 50 dB HL. These results are based on [facility] Auditory Test No.6 (NU-6) Ordered by difficulty (N=10).   Left Ear: Speech Reception Threshold (SRT) was obtained at 10 dB HL. This is in good agreement with three frequency Pure Tone Average (PTA).  Word Recognition scores in quiet were excellent (100%) when words were presented at 50 dB HL. These results are based on [facility] Auditory Test No.6 (NU-6) Ordered by difficulty (N=10).          Kathryn Turner, AUD, CCC-A  Licensed Clinical Audiologist    Degree of Hearing Decibel Range  Key   Within Normal Limits 0-20  CNT Could Not Test   Slight 21-25  DNT Did Not Test   Mild 26-40  ECV Ear Canal Volume   Moderate 41-55  OAE Otoacoustic Emissions   Moderately-Severe 56-70  SIN Speech in Noise   Severe 71-90  TM Tympanic Membrane   Profound 91+  HA Hearing Aid   Sensorineural Hearing Loss SNHL  MLV Monitored Live Voice   Conductive Hearing Loss CHL  WNL Within Normal Limits   Noise-Induced Hearing Loss NIHL

## 2024-12-20 ENCOUNTER — OFFICE VISIT (OUTPATIENT)
Dept: OTOLARYNGOLOGY | Facility: CLINIC | Age: 24
End: 2024-12-20
Payer: COMMERCIAL

## 2024-12-20 VITALS — WEIGHT: 116 LBS | HEIGHT: 64 IN | BODY MASS INDEX: 19.81 KG/M2

## 2024-12-20 DIAGNOSIS — H93.8X3 SENSATION OF FULLNESS IN BOTH EARS: ICD-10-CM

## 2024-12-20 DIAGNOSIS — H61.21 IMPACTED CERUMEN OF RIGHT EAR: Primary | ICD-10-CM

## 2024-12-20 DIAGNOSIS — M26.609 TMJ DYSFUNCTION: ICD-10-CM

## 2024-12-20 DIAGNOSIS — H92.03 OTALGIA OF BOTH EARS: ICD-10-CM

## 2024-12-20 DIAGNOSIS — H60.8X3 CHRONIC ECZEMATOUS OTITIS EXTERNA OF BOTH EARS: ICD-10-CM

## 2024-12-20 PROCEDURE — 3008F BODY MASS INDEX DOCD: CPT | Performed by: NURSE PRACTITIONER

## 2024-12-20 PROCEDURE — 99214 OFFICE O/P EST MOD 30 MIN: CPT | Performed by: NURSE PRACTITIONER

## 2024-12-20 PROCEDURE — 1036F TOBACCO NON-USER: CPT | Performed by: NURSE PRACTITIONER

## 2024-12-20 RX ORDER — FLUOCINOLONE ACETONIDE 0.11 MG/ML
OIL AURICULAR (OTIC)
Qty: 20 ML | Refills: 1 | Status: SHIPPED | OUTPATIENT
Start: 2024-12-20

## 2024-12-20 ASSESSMENT — PATIENT HEALTH QUESTIONNAIRE - PHQ9
1. LITTLE INTEREST OR PLEASURE IN DOING THINGS: NOT AT ALL
SUM OF ALL RESPONSES TO PHQ9 QUESTIONS 1 AND 2: 0
2. FEELING DOWN, DEPRESSED OR HOPELESS: NOT AT ALL

## 2024-12-20 NOTE — PROGRESS NOTES
Subjective   Patient ID: Melida Barth is a 24 y.o. female who presents for Recurrent Otitis.  HPI  This patient presents for a follow-up visit.  In review, she has a history of chronic eczematous otitis externa.  Today, she reports having 2 ear infections in the past year.  She is currently out of [place] for grad school and has seen a PCP there who treated her with oral antibiotics both times.  Her last infection was in October.  Today, she endorses bilateral fullness and left greater than right otalgia.  She also endorses chronic EAC itching.  She is accompanied today by her mom.  She endorses history of bruxism.  Review of Systems  A comprehensive or 10 points review of the patient's constitutional, neurological, HEENT, pulmonary, cardiovascular and genito-urinary systems showed only those mentioned in history of present illness.    Objective   Physical Exam  Constitutional: no fever, chills, weight loss or weight gain   General appearance: Appears well, well-nourished, well groomed. No acute distress.   Communication: Normal communication   Psychiatric: Oriented to person, place and time. Normal mood and affect.   Neurologic: Cranial nerves II-XII grossly intact and symmetric bilaterally.   Head and Face:   Head: Atraumatic with no masses, lesions or scarring.   Face: Normal symmetry, no paralysis, synkinesis or facial tic. No scars or deformities.   TMJs: Significant bilateral crepitus  Eyes: Conjunctiva not edematous or erythematous   Ears: External inspection of ears with no deformity, scars or masses.  Right canal clear, but with cerumen impacted onto the TM.  Left canal clear.  TM intact.  No effusions or retractions noted.  Skin of both EACs is slightly erythematous and peeling consistent with eczema.     Neck: Normal appearing, symmetric, trachea midline.   Cardiovascular: Examination of peripheral vascular system shows no clubbing or cyanosis.   Respiratory: No respiratory distress increased work of  breathing. Inspection of the chest with symmetric chest expansion and normal respiratory effort.   Skin: No rashes in the head or neck    Bilateral tympanograms today were normal.  Assessment/Plan        This patient presents for subsequent evaluation of acute acquired right-sided cerumen impaction,  bilateral aural fullness, bilateral otalgia, as well as chronic bilateral TMJ dysfunction and bilateral eczematous otitis externa.    Reassurance given that there is no sign of infection on today's exam and her tympanograms were both normal.  I believe that her discomfort is likely combination of chronic TMJ dysfunction and inflammation from the eczema of both ear canals.  I again recommended steroid drops as needed as well as comfort measures for TMJ dysfunction.  She may follow-up as needed.  All questions were answered to patient and family's satisfaction.    This note was created using speech recognition transcription software. Despite proofreading, several typographical errors might be present that might affect the meaning of the content. Please call with any questions.  Patient ID: Melida Barth is a 24 y.o. female.    Ear cerumen removal    Date/Time: 12/20/2024 11:23 AM    Performed by: REGINALDO Castro  Authorized by: REGINALDO Castro    Consent:     Consent obtained:  Verbal    Consent given by:  Patient    Risks discussed:  Pain    Alternatives discussed:  No treatment  Procedure details:     Location:  R ear    Procedure type comment:  Suction    Procedure outcomes: cerumen removed    Post-procedure details:     Inspection:  No bleeding, ear canal clear and TM intact    Hearing quality:  Normal    Procedure completion:  Tolerated well, no immediate complications  Comments:      Moderate amount of dry yellow cerumen removed off the right TM without difficulty.      REGINALDO Castro 12/20/24 11:19 AM

## 2024-12-30 ENCOUNTER — APPOINTMENT (OUTPATIENT)
Dept: OBSTETRICS AND GYNECOLOGY | Facility: CLINIC | Age: 24
End: 2024-12-30
Payer: COMMERCIAL

## 2024-12-30 VITALS
SYSTOLIC BLOOD PRESSURE: 94 MMHG | DIASTOLIC BLOOD PRESSURE: 60 MMHG | HEIGHT: 64 IN | BODY MASS INDEX: 20.01 KG/M2 | WEIGHT: 117.2 LBS

## 2024-12-30 DIAGNOSIS — L29.2 VULVAR PRURITUS: ICD-10-CM

## 2024-12-30 DIAGNOSIS — N76.0 ACUTE VAGINITIS: ICD-10-CM

## 2024-12-30 DIAGNOSIS — N76.1 CHRONIC VAGINITIS: Primary | ICD-10-CM

## 2024-12-30 PROCEDURE — 88312 SPECIAL STAINS GROUP 1: CPT | Performed by: PATHOLOGY

## 2024-12-30 PROCEDURE — 88305 TISSUE EXAM BY PATHOLOGIST: CPT | Performed by: PATHOLOGY

## 2024-12-30 PROCEDURE — 88305 TISSUE EXAM BY PATHOLOGIST: CPT

## 2024-12-30 PROCEDURE — 87102 FUNGUS ISOLATION CULTURE: CPT

## 2024-12-30 PROCEDURE — 1036F TOBACCO NON-USER: CPT | Performed by: ADVANCED PRACTICE MIDWIFE

## 2024-12-30 PROCEDURE — 99214 OFFICE O/P EST MOD 30 MIN: CPT | Performed by: ADVANCED PRACTICE MIDWIFE

## 2024-12-30 PROCEDURE — 88312 SPECIAL STAINS GROUP 1: CPT

## 2024-12-30 PROCEDURE — 87206 SMEAR FLUORESCENT/ACID STAI: CPT

## 2024-12-30 PROCEDURE — 3008F BODY MASS INDEX DOCD: CPT | Performed by: ADVANCED PRACTICE MIDWIFE

## 2024-12-30 RX ORDER — ADALIMUMAB-ADAZ 40 MG/.4ML
40 INJECTION, SOLUTION SUBCUTANEOUS
COMMUNITY
Start: 2024-05-15

## 2024-12-30 RX ORDER — CLOBETASOL PROPIONATE 0.5 MG/G
OINTMENT TOPICAL 2 TIMES DAILY
Qty: 45 G | Refills: 1 | Status: SHIPPED | OUTPATIENT
Start: 2024-12-30

## 2024-12-30 ASSESSMENT — ENCOUNTER SYMPTOMS
ALLERGIC/IMMUNOLOGIC NEGATIVE: 0
EYES NEGATIVE: 0
MUSCULOSKELETAL NEGATIVE: 0
NEUROLOGICAL NEGATIVE: 0
CONSTITUTIONAL NEGATIVE: 0
ENDOCRINE NEGATIVE: 0
HEMATOLOGIC/LYMPHATIC NEGATIVE: 0
GASTROINTESTINAL NEGATIVE: 0
CARDIOVASCULAR NEGATIVE: 0
RESPIRATORY NEGATIVE: 0
PSYCHIATRIC NEGATIVE: 0

## 2024-12-30 ASSESSMENT — PAIN SCALES - GENERAL: PAINLEVEL_OUTOF10: 1

## 2024-12-30 NOTE — PROGRESS NOTES
"Subjective   Patient ID: Melida Barth is a 24 y.o. female with history of crohn's disease who presents for Vaginitis/Bacterial Vaginosis (Reports vaginal irritation past month and a half).    HPI    Symptoms reported: vaginal itching, burning, urgency, and frequency  Onset:  several  months ago   Course: persistent  Progression: worsened    Helpful treatments:  OTC hydrocortisone 1% cream with relief   Unhelpful treatments tried:  monostat didn't help     Noted tiny bumps in the genital area with irritation, sx worse during menstruation   Has similar appearing rash in the right armpit for 2 years, seen dermatologist in the past but unclear etiology   Reports history of eczema     Last PAP in 2024 - normal   Sexual hx: Not-sexually active, never had penetrative sex      Objective   BP 94/60   Ht 1.626 m (5' 4\")   Wt 53.2 kg (117 lb 3.2 oz)   BMI 20.12 kg/m²     Physical Exam:   General Appearance: alert and oriented, in no acute distress  Abdomen: soft, non-tender; bowel sounds normal; no masses, no organomegaly  Pelvic Exam: tiny papular rash with mild erythema noted in the inner labia minora bilaterally, more prominent on the left lower inner labia, irritation on palpation. No abnormal discharge/bleeding or other lesion noted.   Urine dipstick: not done.    Assessment/Plan     25 yo F with history of Crohn's disease presents with concern of persistent vaginal pruritus and irritation. Vaginitis test at the PCP office negative for yeast/BV. Exam notable for tiny erythematous papular rash in the labia minora bilaterally. No significant discharge/lesion noted. Punch biopsy done in the office, pending path result. Recommend using clobetasol ointment for symptom relief.     Diagnoses and all orders for this visit:  Chronic vaginitis  -     Fungal Culture/Smear  -     clobetasol (Temovate) 0.05 % ointment; Apply topically 2 times a day.  -     Surgical Pathology Exam  Vulvar pruritus  -     clobetasol (Temovate) " 0.05 % ointment; Apply topically 2 times a day.  -     Surgical Pathology Exam    Ashutosh Yadav MD  Family Medicine PGY3

## 2025-01-02 LAB
FUNGUS SPEC CULT: NORMAL
FUNGUS SPEC FUNGUS STN: NORMAL

## 2025-01-06 LAB
LABORATORY COMMENT REPORT: NORMAL
PATH REPORT.COMMENTS IMP SPEC: NORMAL
PATH REPORT.FINAL DX SPEC: NORMAL
PATH REPORT.GROSS SPEC: NORMAL
PATH REPORT.RELEVANT HX SPEC: NORMAL
PATH REPORT.TOTAL CANCER: NORMAL

## 2025-01-07 LAB
FUNGUS SPEC CULT: NORMAL
FUNGUS SPEC FUNGUS STN: NORMAL

## 2025-01-08 ENCOUNTER — APPOINTMENT (OUTPATIENT)
Dept: DERMATOLOGY | Facility: CLINIC | Age: 25
End: 2025-01-08
Payer: COMMERCIAL

## 2025-01-13 ENCOUNTER — DOCUMENTATION (OUTPATIENT)
Dept: OBSTETRICS AND GYNECOLOGY | Facility: CLINIC | Age: 25
End: 2025-01-13
Payer: COMMERCIAL

## 2025-01-13 LAB
FUNGUS SPEC CULT: NORMAL
FUNGUS SPEC FUNGUS STN: NORMAL

## 2025-01-13 NOTE — PROGRESS NOTES
Requested medical records faxed to [facility] - Dr. Marilyn De Luna MD  [phone]

## 2025-01-20 LAB
FUNGUS SPEC CULT: NORMAL
FUNGUS SPEC FUNGUS STN: NORMAL

## 2025-02-05 ENCOUNTER — APPOINTMENT (OUTPATIENT)
Dept: DERMATOLOGY | Facility: CLINIC | Age: 25
End: 2025-02-05
Payer: COMMERCIAL

## 2025-02-19 ENCOUNTER — APPOINTMENT (OUTPATIENT)
Dept: DERMATOLOGY | Facility: CLINIC | Age: 25
End: 2025-02-19
Payer: COMMERCIAL

## 2025-02-19 DIAGNOSIS — L75.2 APOCRINE MILIARIA: Primary | ICD-10-CM

## 2025-02-19 DIAGNOSIS — L71.9 ACNE ROSACEA: ICD-10-CM

## 2025-02-19 DIAGNOSIS — L21.9 SEBORRHEIC DERMATITIS: ICD-10-CM

## 2025-02-19 PROCEDURE — 99214 OFFICE O/P EST MOD 30 MIN: CPT | Performed by: DERMATOLOGY

## 2025-02-19 PROCEDURE — 1036F TOBACCO NON-USER: CPT | Performed by: DERMATOLOGY

## 2025-02-19 RX ORDER — CLOBETASOL PROPIONATE 0.5 MG/ML
SOLUTION TOPICAL 2 TIMES DAILY
Qty: 50 ML | Refills: 3 | Status: SHIPPED | OUTPATIENT
Start: 2025-02-19

## 2025-02-19 RX ORDER — SULFACETAMIDE SODIUM 100 MG/ML
LOTION TOPICAL DAILY
Qty: 118 ML | Refills: 6 | Status: SHIPPED | OUTPATIENT
Start: 2025-02-19

## 2025-02-19 NOTE — PATIENT INSTRUCTIONS
[email or web address]     As we discussed, you may continue the hydrocortisone as needed for symptoms as long as you do not use it more than 14 days per month    If you need it more than that, please re-start the tacrolimus. You can overlap and use both the tacrolimus and hydrocortisone for up to 2 weeks to help control the irritation and then try stopping the hydrocortisone

## 2025-02-19 NOTE — PROGRESS NOTES
Subjective     Melida Barth is a 24 y.o. female who presents for the following: Rash (Bilateral axillae and groin). Patient also has a history of chrons on Hyrimoz (humira biosimilar).     Patient last seen 12/20/23. Patient reports a rash in her bilateral axillae for the past few years. She was previously diagnosed with hidradenitis suppurativa and prescribed benzoyl peroxide and clindamycin 1% lotion. She states she used these without improvement. She describes the rash as skin colored itchy bumps. Over the past 2-3 months rash has spread to genital area.     She was seen 12/30/24 by Jenny MAURICIO in OB/GYN for persistent vaginal pruritus and irritation. Vaginitis test negative for yeast/BV. Biopsy 12/30/24 with gynecology - mild interface dermatitis consistent with eczematous hypersensitivity reaction, allergic contact dermatitis, drug reaction, arthropod bite    She saw a private practice dermatologist a few months ago and was given Protopic. However, this caused burning and increased sensitivity. Now, is using hydrocortisone 2.5% cream 2-3 times per week with some improvement in itch.          Review of Systems:  No other skin or systemic complaints other than what is documented elsewhere in the note.    The following portions of the chart were reviewed this encounter and updated as appropriate:   Tobacco  Allergies  Meds  Problems  Med Hx  Surg Hx  Fam Hx         Skin Cancer History  No skin cancer on file.      Specialty Problems          Dermatology Problems    Acne vulgaris    Dermatitis, unspecified    Psoriasis vulgaris    Rosacea, unspecified    Pruritus        Objective   Well appearing patient in no apparent distress; mood and affect are within normal limits.    A focused skin examination was performed. All findings within normal limits unless otherwise noted below.    Assessment/Plan   1. Apocrine miliaria  Left Axilla, Pubic, Right Axilla  On the bilateral axillae, there is  monomorphic skin colored papules. Patient reports lesions in her groin but unable to examine today via virtual visit.     Apocrine miliaria- bilateral axillae and groin   - diagnosis made based on clinical appearance in photos and symptoms  - only axillae were evaluated  - vulvar biopsy from ob/gyne did not demonstrate but there could be sampling error, or she could have a different condition in the genital area and would need in person eval    -Discussed that chronic, pruritic skin condition that is postulated to occur when apocrine or apoeccrine sweat ducts become occluded and inflamed.     -hydrocortisone and tacrolimus are good first-line agents  - she may continue using hydrocortisone but do not use more than 14 days per month. If she needs it more, then we should work on improving her tolerance to the tacrolimus    - for the genital area, try using water to relieve irritation with urination  - AVOID wet wipes, just plain toilet paper or water with a clean hand cloth      Related Procedures  Follow Up In Dermatology - Established Patient    2. Seborrheic dermatitis  Left Axilla, Right Axilla, Scalp  Limited exam today on virtual visit     - needs refill on clobetasol solution    Related Medications  desonide (DesOwen) 0.05 % cream  APPLY TWICE A DAY TO AFFECTED AREA OF EARS UP TO 2 WEEKS, TAPER TO 2-3 TIMES PER WEEK REPEAT EVERY 6-8 WEEKS FOR FLARES    ketoconazole (NIZOral) 2 % shampoo  APPLY 1 APPLICATION TO AFFECTED AREA OF SCALP 2-3 DAYS PER WEEK, LET SIT 5-10 MINUTES BEFORE RINSING    clobetasol (Temovate) 0.05 % external solution  Apply topically 2 times a day. TO AFFECTED AREA OF SCALP (NO FACE, FOLDS) FOR 2 WEEKS, TAPER TO 2-3 TIMES PER WEEK FOR MAINTENANCE REPEAT FLARES    3. Acne rosacea  Head - Anterior (Face)  Clear on exam today    Needs refills on sulfacetamide solution    Related Medications  sulfacetamide sodium-sulfur 9.8-4.8 % lotion  Apply 1 Application topically once daily.    sulfacetamide  suspension (Klaron) 10 % suspension topical  Apply topically once daily. TO AFFECTED AREA OF FACE FOR ACNE ROSACEA BUMPS      RTC 3 months virtual visit     Carmencita Haley DO   PGY-3 resident    I saw and evaluated the patient. I personally obtained the key and critical portions of the history and physical exam or was physically present for key and critical portions performed by the resident/fellow. I reviewed the resident/fellow's documentation and discussed the patient with the resident/fellow. I agree with the resident/fellow's medical decision making as documented in the note and made changes where appropriate.    Melissa Serrato MD

## 2025-02-21 DIAGNOSIS — L21.9 SEBORRHEIC DERMATITIS: ICD-10-CM

## 2025-02-21 RX ORDER — KETOCONAZOLE 20 MG/ML
SHAMPOO, SUSPENSION TOPICAL
Qty: 120 ML | Refills: 11 | Status: SHIPPED | OUTPATIENT
Start: 2025-02-21

## 2025-02-28 DIAGNOSIS — J30.89 ALLERGIC RHINITIS DUE TO DUST MITE: ICD-10-CM

## 2025-02-28 RX ORDER — FLUTICASONE PROPIONATE 50 MCG
SPRAY, SUSPENSION (ML) NASAL
Qty: 48 ML | Refills: 3 | Status: SHIPPED | OUTPATIENT
Start: 2025-02-28

## 2025-03-20 ENCOUNTER — PATIENT MESSAGE (OUTPATIENT)
Dept: DERMATOLOGY | Facility: CLINIC | Age: 25
End: 2025-03-20
Payer: COMMERCIAL

## 2025-03-20 DIAGNOSIS — L75.2 APOCRINE MILIARIA: Primary | ICD-10-CM

## 2025-03-24 RX ORDER — TRIAMCINOLONE ACETONIDE 1 MG/G
OINTMENT TOPICAL 2 TIMES DAILY
Qty: 30 G | Refills: 0 | Status: SHIPPED | OUTPATIENT
Start: 2025-03-24

## 2025-04-07 ENCOUNTER — TELEPHONE (OUTPATIENT)
Dept: DERMATOLOGY | Facility: CLINIC | Age: 25
End: 2025-04-07
Payer: COMMERCIAL

## 2025-04-07 DIAGNOSIS — L21.9 SEBORRHEIC DERMATITIS: ICD-10-CM

## 2025-04-07 NOTE — TELEPHONE ENCOUNTER
Pt of Dr Cabrera and Dannielle Rowan --continue to get faxes from pharmacy for pt to get rf of clobetisol 0.05% sol , I left message with pharmacy today that they are no longer with [facility] ,  Has retired , pt has been seen by Dr Serrato and has prescription ..

## 2025-04-08 RX ORDER — CLOBETASOL PROPIONATE 0.5 MG/ML
SOLUTION TOPICAL
Qty: 50 ML | Refills: 1 | Status: SHIPPED | OUTPATIENT
Start: 2025-04-08

## 2025-05-09 ENCOUNTER — PATIENT MESSAGE (OUTPATIENT)
Dept: DERMATOLOGY | Facility: CLINIC | Age: 25
End: 2025-05-09
Payer: COMMERCIAL

## 2025-05-09 DIAGNOSIS — L71.9 ACNE ROSACEA: ICD-10-CM

## 2025-05-12 RX ORDER — METRONIDAZOLE 7.5 MG/G
CREAM TOPICAL 2 TIMES DAILY
Qty: 45 G | Refills: 6 | Status: SHIPPED | OUTPATIENT
Start: 2025-05-12

## 2025-05-22 ENCOUNTER — APPOINTMENT (OUTPATIENT)
Dept: OTOLARYNGOLOGY | Facility: CLINIC | Age: 25
End: 2025-05-22
Payer: COMMERCIAL

## 2025-05-22 DIAGNOSIS — G47.8 SLEEP DYSFUNCTION WITH AROUSAL DISTURBANCE: ICD-10-CM

## 2025-05-22 DIAGNOSIS — J03.91 RECURRENT TONSILLITIS: ICD-10-CM

## 2025-05-22 DIAGNOSIS — J34.89 NASAL AND SINUS DISCHARGE: ICD-10-CM

## 2025-05-22 DIAGNOSIS — J35.1 TONSILLAR HYPERTROPHY: Primary | ICD-10-CM

## 2025-05-22 PROCEDURE — 99214 OFFICE O/P EST MOD 30 MIN: CPT

## 2025-05-22 RX ORDER — AZELASTINE 1 MG/ML
2 SPRAY, METERED NASAL 2 TIMES DAILY
Qty: 30 ML | Refills: 3 | Status: SHIPPED | OUTPATIENT
Start: 2025-05-22 | End: 2025-06-21

## 2025-05-23 NOTE — PROGRESS NOTES
Reason For Consult  Chief Complaint   Patient presents with    Enlarged Tonsils     3 previous streps since February         HISTORY OF PRESENT ILLNESS:   Melida Barth, who is a 25 y.o. female presenting for an initial visit for recurrent streptococcal pharyngitis since February of 2025.  The patient reports that she has not had issues with tonsillitis or strep throat prior to February of this year. Patient reports that she will feel like her tonsils become enlarged and swell in between episodes of strep over the past 3-4 months. Patient reports nasal congestion and drainage. Report using Flonase intermittently for years.  Patient reports snoring and some daytime fatigue. Patient reports that swallowing pills can be more difficult, but tolerating solids and liquids. Patient denies any shortness of breath. Patient reports voice is stable. No smoking history.       Past Medical History  She has a past medical history of Impacted cerumen, bilateral (07/27/2018), Other specified disorders of muscle (07/13/2018), Personal history of other diseases of the digestive system, Personal history of other diseases of the respiratory system (04/14/2018), Personal history of other diseases of the respiratory system (05/16/2018), Personal history of other drug therapy, Personal history of other mental and behavioral disorders (12/30/2018), and Psychological and behavioral factors associated with disorders or diseases classified elsewhere (12/07/2013).  Surgical History  She has a past surgical history that includes Other surgical history (08/07/2019).     Social History  She reports that she has never smoked. She has never been exposed to tobacco smoke. She has never used smokeless tobacco. She reports current alcohol use. She reports that she does not use drugs.    Allergies  Infliximab and Ibuprofen    Review of Systems  All 10 systems were reviewed and negative except for above.      Physical Exam  CONSTITUTIONAL: Well  developed, well nourished.    VOICE: Normal   RESPIRATION: Breathing comfortably, no stridor.    NEURO: Alert and oriented x3, cranial nerves II-XII intact and symmetric bilaterally.    EARS: Normal external ears, external auditory canals, normal hearing to conversational voice.    NOSE: External nose midline, anterior rhinoscopy is normal with limited visualization to the anterior aspect of the interior turbinates. No lesions noted.     ORAL CAVITY/OROPHARYNX/LIPS: Normal mucous membranes, normal floor of mouth/tongue/OP, no masses or lesions are noted.    SKIN: Neck skin is intact   PSYCH: Alert and oriented with appropriate mood and affect.        Last Recorded Vitals  There were no vitals taken for this visit.        ASSESSMENT AND PLAN:   This is an initial visit for recurrent tonsillitis with episodes of strep 3 times since February of 2025. No peritonsillar abscess present bilaterally. Tonsil tissue 2-3+ on right, left 2+. Uvula elongated. Discussed salt water gargles and use of a water pik for cleaning of tonsillar crypts. For nasal congestion and post nasal drainage-order placed for Azelastine nasal spray BID. Discussed with patient home interventions, if symptoms still persistent can see patient in office. Will order HSAT for sleep disturbances and snoring. Will call patient with results and can discuss symptoms at that time. All questions answered. Patient agreeable to plan.   Secondary issues identified and managed on this patient visit include:

## 2025-06-07 ENCOUNTER — PROCEDURE VISIT (OUTPATIENT)
Dept: SLEEP MEDICINE | Facility: HOSPITAL | Age: 25
End: 2025-06-07
Payer: COMMERCIAL

## 2025-06-07 DIAGNOSIS — G47.8 SLEEP DYSFUNCTION WITH AROUSAL DISTURBANCE: ICD-10-CM

## 2025-06-07 PROCEDURE — 95806 SLEEP STUDY UNATT&RESP EFFT: CPT | Performed by: HOSPITALIST

## 2025-06-15 DIAGNOSIS — J34.89 NASAL AND SINUS DISCHARGE: ICD-10-CM

## 2025-06-16 RX ORDER — AZELASTINE 1 MG/ML
SPRAY, METERED NASAL
Qty: 90 ML | Refills: 1 | Status: SHIPPED | OUTPATIENT
Start: 2025-06-16 | End: 2025-09-14

## 2025-07-28 ENCOUNTER — TELEMEDICINE (OUTPATIENT)
Dept: OTOLARYNGOLOGY | Facility: HOSPITAL | Age: 25
End: 2025-07-28
Payer: COMMERCIAL

## 2025-07-28 DIAGNOSIS — J03.91 RECURRENT TONSILLITIS: ICD-10-CM

## 2025-07-28 DIAGNOSIS — J35.1 TONSILLAR HYPERTROPHY: Primary | ICD-10-CM

## 2025-07-28 DIAGNOSIS — R09.81 NASAL CONGESTION: ICD-10-CM

## 2025-07-28 NOTE — PROGRESS NOTES
Assessment/Plan   Assessment & Plan  The patient presents with chronic tonsillitis. Reasonable candidate for tonsillectomy based on quality of life impact. Discussed risks, benefits, and alternatives. Procedure involves sore throat for 1-1.5 weeks, worst pain on days 3-4. Bleeding risk 1-2%, up to 3-5% nationally. Pain management and hydration post-surgery crucial. Advised to avoid scratchy foods. Consider watching symptoms over next few months, possibly schedule for November or December. If no additional episodes, may cancel; if frequent episodes continue, consider elective tonsillectomy.         Reason For Consult  No chief complaint on file.    History of Present Illness  The patient is a 25-year-old female who has been dealing with recurring health issues.    Recurring Strep Throat  - About four episodes since February 2025, the most recent one being in June 2025  - Referred to discuss the possibility of having her tonsils removed due to their large size and tendency to swell  - Swelling can make it harder for her to breathe when inflamed  - Recovers quickly with antibiotics, usually feeling better within a day or two  - Each episode keeps her out of work for about 1 to 3 days    Nasal Congestion and Related Symptoms  - Nasal congestion  - Drainage  - Snoring  - Feeling tired during the day  - Sometimes finds it hard to swallow pills    Sleep Apnea  - Home sleep study done on July 7, 2025, showed some mild sleep apnea  - Oxygen level dropping to 83.6% at its lowest    Additional Information  - No issues with shortness of breath  - No history of bleeding problems or heavy periods  - Has Crohn's disease and is on medication for it but hasn't had any issues with bleeding    SOCIAL HISTORY  - 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  - Also teaching       Past Medical History  She has a past medical history of Impacted cerumen, bilateral (07/27/2018), Other specified disorders of muscle (07/13/2018), Personal  history of other diseases of the digestive system, Personal history of other diseases of the respiratory system (04/14/2018), Personal history of other diseases of the respiratory system (05/16/2018), Personal history of other drug therapy, Personal history of other mental and behavioral disorders (12/30/2018), and Psychological and behavioral factors associated with disorders or diseases classified elsewhere (12/07/2013). Surgical History  She has a past surgical history that includes Other surgical history (08/07/2019).   Social History  She reports that she has never smoked. She has never been exposed to tobacco smoke. She has never used smokeless tobacco. She reports current alcohol use. She reports that she does not use drugs. Allergies  Infliximab and Ibuprofen     Family History  Family History[1]     Review of Systems  All 10 systems were reviewed and negative except for above.      Physical Exam  ENT Physical Exam      Physical Exam       ----------------------------------------------------------------------  Procedures     Last Recorded Vitals  There were no vitals taken for this visit.    Relevant Results    Results  - Home sleep study (07/07/2025):    - Apnea-hypopnea index: 12.2 at 4%, 5.5 at 3%    - Oxygen shani: 83.6%          Patient Reported Outcome Measures       Radiology, Laboratory and Pathology     Time Spent  Prep time on day of patient encounter: 5-10 minutes  Time spent directly with patient, family or caregiver: 25 minutes  Additional Time Spent on Patient Care Activities/discuss care plan with SLP: 5 minutes  Documentation Time: 10 minutes  Other Time Spent: 0 minutes  Total: 50 minutes     This medical note was created with the assistance of artificial intelligence (AI) for documentation purposes. The content has been reviewed and confirmed by the healthcare provider for accuracy and completeness. Patient consented to the use of audio recording and use of AI during their visit.              [1] No family history on file.